# Patient Record
Sex: MALE | Race: WHITE | Employment: OTHER | URBAN - METROPOLITAN AREA
[De-identification: names, ages, dates, MRNs, and addresses within clinical notes are randomized per-mention and may not be internally consistent; named-entity substitution may affect disease eponyms.]

---

## 2019-02-02 ENCOUNTER — HOSPITAL ENCOUNTER (OUTPATIENT)
Age: 59
Discharge: HOME | End: 2019-02-02
Payer: COMMERCIAL

## 2019-02-02 LAB
ALBUMIN SERPL-MCNC: 4.5 G/DL (ref 3.5–5.2)
ALP BLD-CCNC: 81 U/L (ref 40–129)
ALT SERPL-CCNC: 46 U/L (ref 0–40)
ANION GAP SERPL CALCULATED.3IONS-SCNC: 11 MMOL/L (ref 7–16)
AST SERPL-CCNC: 28 U/L (ref 0–39)
BASOPHILS ABSOLUTE: 0.03 E9/L (ref 0–0.2)
BASOPHILS RELATIVE PERCENT: 0.5 % (ref 0–2)
BILIRUB SERPL-MCNC: 0.6 MG/DL (ref 0–1.2)
BUN BLDV-MCNC: 12 MG/DL (ref 6–20)
CALCIUM SERPL-MCNC: 9.4 MG/DL (ref 8.6–10.2)
CHLORIDE BLD-SCNC: 101 MMOL/L (ref 98–107)
CHOLESTEROL, FASTING: 253 MG/DL (ref 0–199)
CO2: 27 MMOL/L (ref 22–29)
CREAT SERPL-MCNC: 0.9 MG/DL (ref 0.7–1.2)
EOSINOPHILS ABSOLUTE: 0.21 E9/L (ref 0.05–0.5)
EOSINOPHILS RELATIVE PERCENT: 3.2 % (ref 0–6)
GFR AFRICAN AMERICAN: >60
GFR NON-AFRICAN AMERICAN: >60 ML/MIN/1.73
GLUCOSE BLD-MCNC: 98 MG/DL (ref 74–99)
HBA1C MFR BLD: 5.4 % (ref 4–5.6)
HCT VFR BLD CALC: 42.4 % (ref 37–54)
HDLC SERPL-MCNC: 43 MG/DL
HEMOGLOBIN: 14.6 G/DL (ref 12.5–16.5)
IMMATURE GRANULOCYTES #: 0.01 E9/L
IMMATURE GRANULOCYTES %: 0.2 % (ref 0–5)
LDL CHOLESTEROL CALCULATED: 170 MG/DL (ref 0–99)
LYMPHOCYTES ABSOLUTE: 1.92 E9/L (ref 1.5–4)
LYMPHOCYTES RELATIVE PERCENT: 28.8 % (ref 20–42)
MCH RBC QN AUTO: 33.6 PG (ref 26–35)
MCHC RBC AUTO-ENTMCNC: 34.4 % (ref 32–34.5)
MCV RBC AUTO: 97.5 FL (ref 80–99.9)
MONOCYTES ABSOLUTE: 0.53 E9/L (ref 0.1–0.95)
MONOCYTES RELATIVE PERCENT: 8 % (ref 2–12)
NEUTROPHILS ABSOLUTE: 3.96 E9/L (ref 1.8–7.3)
NEUTROPHILS RELATIVE PERCENT: 59.3 % (ref 43–80)
PDW BLD-RTO: 12.4 FL (ref 11.5–15)
PLATELET # BLD: 165 E9/L (ref 130–450)
PMV BLD AUTO: 11.8 FL (ref 7–12)
POTASSIUM SERPL-SCNC: 4.5 MMOL/L (ref 3.5–5)
PROSTATE SPECIFIC ANTIGEN: 0.25 NG/ML (ref 0–4)
RBC # BLD: 4.35 E12/L (ref 3.8–5.8)
SODIUM BLD-SCNC: 139 MMOL/L (ref 132–146)
T4 TOTAL: 6 MCG/DL (ref 4.5–11.7)
TOTAL PROTEIN: 6.6 G/DL (ref 6.4–8.3)
TRIGLYCERIDE, FASTING: 199 MG/DL (ref 0–149)
TSH SERPL DL<=0.05 MIU/L-ACNC: 1.49 UIU/ML (ref 0.27–4.2)
VLDLC SERPL CALC-MCNC: 40 MG/DL
WBC # BLD: 6.7 E9/L (ref 4.5–11.5)

## 2019-02-02 PROCEDURE — 80053 COMPREHEN METABOLIC PANEL: CPT

## 2019-02-02 PROCEDURE — 36415 COLL VENOUS BLD VENIPUNCTURE: CPT

## 2019-02-02 PROCEDURE — 83036 HEMOGLOBIN GLYCOSYLATED A1C: CPT

## 2019-02-02 PROCEDURE — 85025 COMPLETE CBC W/AUTO DIFF WBC: CPT

## 2019-02-02 PROCEDURE — 84436 ASSAY OF TOTAL THYROXINE: CPT

## 2019-02-02 PROCEDURE — 84443 ASSAY THYROID STIM HORMONE: CPT

## 2019-02-02 PROCEDURE — 80061 LIPID PANEL: CPT

## 2019-02-02 PROCEDURE — G0103 PSA SCREENING: HCPCS

## 2019-12-23 RX ORDER — PERMETHRIN 50 MG/G
CREAM TOPICAL
COMMUNITY
End: 2020-02-20 | Stop reason: SDUPTHER

## 2020-02-20 ENCOUNTER — OFFICE VISIT (OUTPATIENT)
Dept: PRIMARY CARE CLINIC | Age: 60
End: 2020-02-20
Payer: COMMERCIAL

## 2020-02-20 VITALS
DIASTOLIC BLOOD PRESSURE: 82 MMHG | SYSTOLIC BLOOD PRESSURE: 138 MMHG | HEIGHT: 70 IN | WEIGHT: 269 LBS | TEMPERATURE: 98.3 F | BODY MASS INDEX: 38.51 KG/M2

## 2020-02-20 PROCEDURE — 99396 PREV VISIT EST AGE 40-64: CPT | Performed by: FAMILY MEDICINE

## 2020-02-20 RX ORDER — PERMETHRIN 50 MG/G
CREAM TOPICAL ONCE
Qty: 1 TUBE | Refills: 12 | Status: SHIPPED | OUTPATIENT
Start: 2020-02-20 | End: 2020-02-21 | Stop reason: SDUPTHER

## 2020-02-20 NOTE — PROGRESS NOTES
F42.9 Obsessive-compulsive disorder, unspecified    Care Plan:    Assessment #4: Z68.38 Body mass index (BMI) 38.0-38.9, adult    Care Plan:    Comments : LOW FAT DIET EXER --- FU WITH DERM ARMAILE AND PSYCH------A    GREAT DEAL OF TIME SPENT REVIEWING MEDS, DIET, EXERCISE, SOCIAL    ISSUES. ALSO REVIEWING CHART BEFORE ENTERING ROOM WITH PATIENT    AND FINISHING CHARTING AFTER LEAVING PATIENT'S ROOM-----MORE THAN    HALF OF THE FACE-TO-FACE TIME WITH PATIENT SPENT IN COUNSELING    AND COORDINATING CARE----- ------ WARNED OF RISK OF SIDE EFFECTS OF    MEDS AND POSSIBLE MEDICATION INTERACTIONS ------GREAT DEAL OF    TIME SPENT REVIEWING ALL LABS RESULTS AND COMPARING WITH OLD    NUMBERS AND EXPLAINING THE MEANING OF THE TESTS------ --------GREAT    DEAL OF TIME EXPLAINING DISEASE PROCESS AND RISK ASSOCIATED WITH    SAME, LONG TERM IMPLICATIONS AND POSSIBLE ADVERSE CONDITIONS.    -----TAUGHT SYMPTOMS TO WATCH AND NOTIFY-----    Follow Up : ONE YEAR LAB [address] [facility]       Past Medical History:   Diagnosis Date    Hyperlipidemia     Hypertension     Type 2 diabetes mellitus without complication ([facility] [address]      No family history on file. No past surgical history on file. Vitals:    02/20/20 1243   BP: 138/82   Temp: 98.3 °F (36.8 °C)   Weight: 269 lb (122 kg)   Height: 5' 10\" (1.778 m)       Objective:    Physical Exam  Vitals signs reviewed. Constitutional:       Appearance: He is well-developed. HENT:      Head: Normocephalic. Eyes:      Pupils: Pupils are equal, round, and reactive to light. Neck:      Musculoskeletal: Normal range of motion. Cardiovascular:      Rate and Rhythm: Normal rate and regular rhythm. Pulmonary:      Effort: Pulmonary effort is normal.      Breath sounds: Normal breath sounds. Abdominal:      Palpations: Abdomen is soft. Musculoskeletal: Normal range of motion. Skin:     General: Skin is warm. Comments: No visible rash.    Neurological:      Mental Status: He is alert and oriented to person, place, and time. Psychiatric:         Behavior: Behavior normal.         Isla Buerger was seen today for annual exam.    Diagnoses and all orders for this visit:    Encounter for annual general medical examination with abnormal findings in adult    Anxiety    Other orders  -     permethrin (ELIMITE) 5 % cream; Apply topically once for 1 dose Apply topically as directed  -     neomycin-polymyxin-dexamethasone (MAXITROL) 0.1 % ophthalmic suspension; Place 2 drops into both eyes 4 times daily        Comments: He refuses see any specialist at all. He refuses to consider his delusions of parasite ptosis and anxiety issue. He himself states that he has parasites on his body. I am unable to convince him to do anything about it. He is aware of the risk. He refused laboratory last year repeat laboratory studies in the system advised him do so soon and see me a week later. Maintenance issues. He refuses any colonoscopies or any testing at all. He refuses any immunizations. A great deal of time spent reviewing medications, diet, exercise, social issues. Also reviewing the chart before entering the room with patient and finishing charting after leaving patient's room. More than half of that time was spent face to face with the patient in counseling and coordinating care. Follow Up: Return in about 1 year (around 2/20/2021).      Seen by:  Aleah Palm DO

## 2020-02-21 ENCOUNTER — TELEPHONE (OUTPATIENT)
Dept: PRIMARY CARE CLINIC | Age: 60
End: 2020-02-21

## 2020-02-21 PROBLEM — F41.9 ANXIETY: Chronic | Status: ACTIVE | Noted: 2020-02-21

## 2020-02-21 PROBLEM — F41.9 ANXIETY: Status: ACTIVE | Noted: 2020-02-21

## 2020-02-21 RX ORDER — PERMETHRIN 50 MG/G
CREAM TOPICAL ONCE
Qty: 6 TUBE | Refills: 12 | Status: SHIPPED | OUTPATIENT
Start: 2020-02-21 | End: 2020-02-21

## 2020-02-21 ASSESSMENT — PATIENT HEALTH QUESTIONNAIRE - PHQ9
SUM OF ALL RESPONSES TO PHQ QUESTIONS 1-9: 0
1. LITTLE INTEREST OR PLEASURE IN DOING THINGS: 0
SUM OF ALL RESPONSES TO PHQ9 QUESTIONS 1 & 2: 0
2. FEELING DOWN, DEPRESSED OR HOPELESS: 0
SUM OF ALL RESPONSES TO PHQ QUESTIONS 1-9: 0

## 2020-02-21 ASSESSMENT — ENCOUNTER SYMPTOMS
SHORTNESS OF BREATH: 1
ALLERGIC/IMMUNOLOGIC NEGATIVE: 1
GASTROINTESTINAL NEGATIVE: 1
EYES NEGATIVE: 1

## 2020-02-21 NOTE — TELEPHONE ENCOUNTER
The pt is calling because normally you give him 3 tubes a month of his permethrin 5 % cream but you only gave him one tube and he says that will not be enough

## 2020-03-13 RX ORDER — SULFAMETHOXAZOLE AND TRIMETHOPRIM 800; 160 MG/1; MG/1
1 TABLET ORAL
COMMUNITY
End: 2021-02-26

## 2020-03-13 RX ORDER — PERMETHRIN 50 MG/G
CREAM TOPICAL
COMMUNITY
Start: 2020-02-21 | End: 2020-03-13 | Stop reason: SDUPTHER

## 2020-03-14 RX ORDER — PERMETHRIN 50 MG/G
CREAM TOPICAL ONCE
Qty: 1 TUBE | Refills: 12 | Status: SHIPPED | OUTPATIENT
Start: 2020-03-14 | End: 2020-03-14

## 2020-03-20 RX ORDER — PERMETHRIN 50 MG/G
CREAM TOPICAL
Qty: 1 TUBE | Refills: 12 | Status: SHIPPED
Start: 2020-03-20 | End: 2020-03-24 | Stop reason: SDUPTHER

## 2020-03-20 RX ORDER — PERMETHRIN 50 MG/G
CREAM TOPICAL
COMMUNITY
Start: 2020-03-19 | End: 2020-03-20 | Stop reason: SDUPTHER

## 2020-03-20 NOTE — TELEPHONE ENCOUNTER
Please add proper directions    Name of Medication(s) Requested:  permethrin cream 5%     [facility]    Medication(s) pended? [x] Yes  [] No    Last Appointment:  2/20/2020    Future appts:  Future Appointments   Date Time Provider Jabier Giles   2/26/2021  3:45 PM DO [facility]        Does patient need call back?   [] Yes  [x] No

## 2020-03-24 RX ORDER — PERMETHRIN 50 MG/G
CREAM TOPICAL
Qty: 6 TUBE | Refills: 12 | Status: SHIPPED
Start: 2020-03-24 | End: 2020-04-02 | Stop reason: SDUPTHER

## 2020-03-24 NOTE — TELEPHONE ENCOUNTER
Clarification request from [employer]. Please resend Permethrin cream 5% 60 grams with the proper directions.

## 2020-04-02 RX ORDER — PERMETHRIN 50 MG/G
CREAM TOPICAL
Qty: 6 TUBE | Refills: 12 | Status: SHIPPED
Start: 2020-04-02 | End: 2020-11-19 | Stop reason: SDUPTHER

## 2020-11-19 RX ORDER — PERMETHRIN 50 MG/G
CREAM TOPICAL
Qty: 9 TUBE | Refills: 12 | Status: SHIPPED
Start: 2020-11-19 | End: 2021-02-26 | Stop reason: SDUPTHER

## 2021-02-26 ENCOUNTER — OFFICE VISIT (OUTPATIENT)
Dept: PRIMARY CARE CLINIC | Age: 61
End: 2021-02-26
Payer: COMMERCIAL

## 2021-02-26 DIAGNOSIS — L30.9 DERMATITIS: ICD-10-CM

## 2021-02-26 DIAGNOSIS — Z00.01 ENCOUNTER FOR ANNUAL GENERAL MEDICAL EXAMINATION WITH ABNORMAL FINDINGS IN ADULT: Primary | ICD-10-CM

## 2021-02-26 PROCEDURE — G8484 FLU IMMUNIZE NO ADMIN: HCPCS | Performed by: FAMILY MEDICINE

## 2021-02-26 PROCEDURE — 99396 PREV VISIT EST AGE 40-64: CPT | Performed by: FAMILY MEDICINE

## 2021-02-26 RX ORDER — PERMETHRIN 50 MG/G
CREAM TOPICAL
Qty: 9 TUBE | Refills: 12 | Status: SHIPPED
Start: 2021-02-26 | End: 2022-02-14 | Stop reason: SDUPTHER

## 2021-02-26 NOTE — PROGRESS NOTES
21  Name: Sam Painter    : 1960    Sex: male    Age: 62 y.o. Subjective:  Chief Complaint: Patient is here for one year ck     Failed lab   Down two  Lbs   obessed with his thought that he has bugs crawling all over his body. It upsets him constantly. I have previously talked to the wife and she is very frustrated with him. His entire day is spent him trying to put things on his skin. He uses all kind of medications including bleach. .  I warned him that he is really damaging his body but he processed with his delusions of  parasitosis. he refuses to see dermatology pulmonary cardiology. [facility] and [facility]  refuses. He refused to use an inhaler. He only wants a refill on his permethrin cream      Review of Systems   Constitutional: Negative. HENT: Negative. Eyes: Negative. Respiratory: Negative. Cardiovascular: Negative. Gastrointestinal: Negative. Endocrine: Negative. Genitourinary: Negative. Musculoskeletal: Negative. Skin: Positive for rash (see  hpi). Allergic/Immunologic: Negative. Neurological: Negative. Hematological: Negative. Psychiatric/Behavioral: Negative.           Current Outpatient Medications:     neomycin-polymyxin-dexamethasone (MAXITROL) 0.1 % ophthalmic suspension, Place 2 drops into both eyes 4 times daily, Disp: 1 Bottle, Rfl: 12    permethrin (ELIMITE) 5 % cream, Apply topically daily, Disp: 9 Tube, Rfl: 12  No Known Allergies  Social History     Socioeconomic History    Marital status:      Spouse name: Not on file    Number of children: Not on file    Years of education: Not on file    Highest education level: Not on file   Occupational History    Not on file   Social Needs    Financial resource strain: Not on file    Food insecurity     Worry: Not on file     Inability: Not on file    Transportation needs     Medical: Not on file     Non-medical: Not on file   Tobacco Use    Smoking status: Former Smoker     Types: Cigarettes    Smokeless tobacco: Never Used   Substance and Sexual Activity    Alcohol use:  Yes    Drug use: Not on file    Sexual activity: Not on file   Lifestyle    Physical activity     Days per week: Not on file     Minutes per session: Not on file    Stress: Not on file   Relationships    Social connections     Talks on phone: Not on file     Gets together: Not on file     Attends Mu-ism service: Not on file     Active member of club or organization: Not on file     Attends meetings of clubs or organizations: Not on file     Relationship status: Not on file    Intimate partner violence     Fear of current or ex partner: Not on file     Emotionally abused: Not on file     Physically abused: Not on file     Forced sexual activity: Not on file   Other Topics Concern    Not on file   Social History Narrative    [address]#: Eugene Fanvibe : 1960 Sex: M Age: 63 years    Subjective    CC: HERE FOR CK RE LAB AND ANX    HPI: FEEL OK NO CHG SAME C/O OF PARASITES AND HE FEEL SOVER WHOLE BODY    AND USES BLEACH LAB NOTED HERE ALONE PT [facility] CK HE SEESING PSYCH HE SPENDS 6 HRS A DAY [address]:    Const: DENIES SYMPTOMS OTHER THAN STATED ABOVE    Eyes: DENIES SUDDEN CHANGE OF VISION    ENMT: DENIES PURULENT DISCHARGE AND PAINFUL LESIONS    CV: DENIES SQUEEZING CHEST PAIN, MASSIVE EDEMA OR CONSTANT PALPITAIONS    Resp: DENIES HEMOPTYSIS    GI: DENIES CONSTANT ABDOMINAL PAIN OR GROSSLY BLOODY DISCHARGBE    : DENIES DYSURIA OR HEMATURIA    Musculo: DENIES JOINT PAIN AND WEAKNESS    Skin: HPI    Neuro: DENIES INTRACTABLE HEADACHE, SEIZURE AND SYNCOPE    Psych: DENIES CONSTANT ANXIETY, SEVERE DEPRESSION AND RECURRENT PANIC    Endocrine: DENIES EXTREME SHIFTS IN GLUCOSE AND SEVERE THYROID SYMPTOMS    Hema/Lymph: DENIES GROSS BLEEDING OR CLOTTING SYMPTOMS    Current Meds:Maxitrol 3.5-87820-9. 1 two drops both eyes four times a day    Permethrin 5 % ad- to whole body-----every 5 days x 6 treatments    Tobrex 0.3 % 2 drops both eyes four times a day x 5 days    Allergies: NKDA    PMH:    Problem List: Essential hypertension, Benign localized hyperplasia of prostate, Hyperlipidemia, Type 2    diabetes mellitus, Benign essential hypertension    Health Maintenance:    Colonoscopy - (3/21/2011)    Colonoscopy Screening - (3/21/2011)    SMOKER--QUIT    ETOH    --LONG DISTANCE    HIGH CHOL--REFUSES TREATMENT---    ONE KID WITH FIRST WIFE---    RASH 4------OCD ABOUT RASH ARAMILE AND [facility]-- DELUSIONS OF PARASITOSIS    PT REFUSES COLONOSCOPY -    DISABILITY FOR PSYCH 2018    HYPERLIPIDEMIA 2-19 REFUSES STATIN OR ANY MED    Reviewed and updated.     Johann Oakes Skipper  1960 Page #2    Objective    BP: 134/82 T: 98.7 Ht: 70\" 5'10\" Ht cm: 177.8 Wt: 271lb Wt Prior: 274lb as of 19 Wt Dif: -3lb Wt    k.926 Wt kg Prior: 124.286 as of 19 Wt kg Dif: -1.360 BMI: 38.9 BSA: 2.38    Exam:    Const: ALERT AND ORIENTED X 3    Eyes: EOMI, ALANA    ENMT: CLEAR WITH NO ERYTHEMA    Neck: SUPPLE AND SYMMETRIC WITH NO MASSES OR JVD    Resp: NO RALES, RONCHI OR WHEEZING    CV:R R R WITH NO MURMURS, CAROTIDS, ABDOMINAL AORTA AND PERIPHERAL PULSES    GOOD    Abdomen: NO MASSES, TENDERNESS, HERNIAS, OR ORGANOMEGALY WITH GOOD BOWEL    SOUNDS    Musculo: NORMAL GAIT    Skin: DRY SKIN AND SCABAS FEW BOTH ARMS AND LEGS    Neuro: ALERT AND ORIENTED X 3 WITH CN AND DTR'S INTACT    Psych: COOPERATIVE, APPROPRIATE AND REALISTIC    Assessment #1: Z00.01 Encounter for general adult medical examination with abnorma    Care Plan:    Assessment #2: I10 Essential (primary) hypertension    Care Plan:    Assessment #3: F42.9 Obsessive-compulsive disorder, unspecified    Care Plan:    Assessment #4: Z68.38 Body mass index (BMI) 38.0-38.9, adult    Care Plan:    Comments : LOW FAT DIET EXER --- visit:    Encounter for annual general medical examination with abnormal findings in adult    Dermatitis  -     neomycin-polymyxin-dexamethasone (MAXITROL) 0.1 % ophthalmic suspension; Place 2 drops into both eyes 4 times daily  -     permethrin (ELIMITE) 5 % cream; Apply topically daily        Comments: refill meds    advised laboratory studies and complete physical and he refuses. I advised evaluation of cardiology and. He refuses. He refuses colonoscopy. Refuses to do any test.  He hardly ever leaves the house any stays in the basement. He has no relation with his son because his son does not want to deal with him. His son lives at home. The wife is frustrated. Offered psychiatry again and he refuses to consider that this is a psychiatric issue. A great deal of time spent reviewing medications, diet, exercise, social issues. Also reviewing the chart before entering the room with patient and finishing charting after leaving patient's room. More than half of that time was spent face to face with the patient in counseling and coordinating care. Follow Up: Return in about 1 year (around 2/26/2022).      Seen by:  Lawrence Harper, DO

## 2021-02-27 VITALS
WEIGHT: 264 LBS | HEART RATE: 105 BPM | TEMPERATURE: 97.5 F | DIASTOLIC BLOOD PRESSURE: 82 MMHG | OXYGEN SATURATION: 99 % | BODY MASS INDEX: 37.88 KG/M2 | SYSTOLIC BLOOD PRESSURE: 135 MMHG

## 2021-02-27 ASSESSMENT — PATIENT HEALTH QUESTIONNAIRE - PHQ9
SUM OF ALL RESPONSES TO PHQ QUESTIONS 1-9: 0
SUM OF ALL RESPONSES TO PHQ QUESTIONS 1-9: 0
1. LITTLE INTEREST OR PLEASURE IN DOING THINGS: 0
2. FEELING DOWN, DEPRESSED OR HOPELESS: 0
SUM OF ALL RESPONSES TO PHQ9 QUESTIONS 1 & 2: 0

## 2021-02-27 ASSESSMENT — ENCOUNTER SYMPTOMS
ALLERGIC/IMMUNOLOGIC NEGATIVE: 1
RESPIRATORY NEGATIVE: 1
GASTROINTESTINAL NEGATIVE: 1
EYES NEGATIVE: 1

## 2021-03-16 ENCOUNTER — TELEPHONE (OUTPATIENT)
Dept: PRIMARY CARE CLINIC | Age: 61
End: 2021-03-16

## 2021-03-16 DIAGNOSIS — Z00.01 ENCOUNTER FOR ANNUAL GENERAL MEDICAL EXAMINATION WITH ABNORMAL FINDINGS IN ADULT: Primary | ICD-10-CM

## 2021-03-17 ENCOUNTER — TELEPHONE (OUTPATIENT)
Dept: PRIMARY CARE CLINIC | Age: 61
End: 2021-03-17

## 2021-03-17 ENCOUNTER — HOSPITAL ENCOUNTER (OUTPATIENT)
Age: 61
Discharge: HOME | End: 2021-03-17
Payer: COMMERCIAL

## 2021-03-17 DIAGNOSIS — R73.03 PRE-DIABETES: ICD-10-CM

## 2021-03-17 DIAGNOSIS — Z00.01 ENCOUNTER FOR ANNUAL GENERAL MEDICAL EXAMINATION WITH ABNORMAL FINDINGS IN ADULT: ICD-10-CM

## 2021-03-17 DIAGNOSIS — R79.89 ELEVATED LIVER FUNCTION TESTS: Primary | ICD-10-CM

## 2021-03-17 DIAGNOSIS — E78.2 MIXED HYPERLIPIDEMIA: ICD-10-CM

## 2021-03-17 LAB
ALBUMIN SERPL-MCNC: 4.5 G/DL (ref 3.5–5.2)
ALP BLD-CCNC: 141 U/L (ref 40–129)
ALT SERPL-CCNC: 57 U/L (ref 0–40)
ANION GAP SERPL CALCULATED.3IONS-SCNC: 10 MMOL/L (ref 7–16)
AST SERPL-CCNC: 42 U/L (ref 0–39)
BASOPHILS ABSOLUTE: 0.04 E9/L (ref 0–0.2)
BASOPHILS RELATIVE PERCENT: 0.6 % (ref 0–2)
BILIRUB SERPL-MCNC: 0.6 MG/DL (ref 0–1.2)
BILIRUBIN URINE: NEGATIVE
BLOOD, URINE: NEGATIVE
BUN BLDV-MCNC: 8 MG/DL (ref 8–23)
CALCIUM SERPL-MCNC: 9.6 MG/DL (ref 8.6–10.2)
CHLORIDE BLD-SCNC: 105 MMOL/L (ref 98–107)
CHOLESTEROL, TOTAL: 221 MG/DL (ref 0–199)
CLARITY: CLEAR
CO2: 27 MMOL/L (ref 22–29)
COLOR: YELLOW
CREAT SERPL-MCNC: 0.8 MG/DL (ref 0.7–1.2)
EOSINOPHILS ABSOLUTE: 0.23 E9/L (ref 0.05–0.5)
EOSINOPHILS RELATIVE PERCENT: 3.3 % (ref 0–6)
GFR AFRICAN AMERICAN: >60
GFR NON-AFRICAN AMERICAN: >60 ML/MIN/1.73
GLUCOSE BLD-MCNC: 110 MG/DL (ref 74–99)
GLUCOSE URINE: NEGATIVE MG/DL
HCT VFR BLD CALC: 39.5 % (ref 37–54)
HDLC SERPL-MCNC: 43 MG/DL
HEMOGLOBIN: 13.6 G/DL (ref 12.5–16.5)
IMMATURE GRANULOCYTES #: 0.01 E9/L
IMMATURE GRANULOCYTES %: 0.1 % (ref 0–5)
KETONES, URINE: NEGATIVE MG/DL
LDL CHOLESTEROL CALCULATED: 164 MG/DL (ref 0–99)
LEUKOCYTE ESTERASE, URINE: NEGATIVE
LYMPHOCYTES ABSOLUTE: 1.61 E9/L (ref 1.5–4)
LYMPHOCYTES RELATIVE PERCENT: 23.4 % (ref 20–42)
MCH RBC QN AUTO: 37.3 PG (ref 26–35)
MCHC RBC AUTO-ENTMCNC: 34.4 % (ref 32–34.5)
MCV RBC AUTO: 108.2 FL (ref 80–99.9)
MONOCYTES ABSOLUTE: 0.59 E9/L (ref 0.1–0.95)
MONOCYTES RELATIVE PERCENT: 8.6 % (ref 2–12)
NEUTROPHILS ABSOLUTE: 4.4 E9/L (ref 1.8–7.3)
NEUTROPHILS RELATIVE PERCENT: 64 % (ref 43–80)
NITRITE, URINE: NEGATIVE
PDW BLD-RTO: 11.6 FL (ref 11.5–15)
PH UA: 5.5 (ref 5–9)
PLATELET # BLD: 166 E9/L (ref 130–450)
PMV BLD AUTO: 11.8 FL (ref 7–12)
POTASSIUM SERPL-SCNC: 4.1 MMOL/L (ref 3.5–5)
PROSTATE SPECIFIC ANTIGEN: 0.2 NG/ML (ref 0–4)
PROTEIN UA: NEGATIVE MG/DL
RBC # BLD: 3.65 E12/L (ref 3.8–5.8)
SODIUM BLD-SCNC: 142 MMOL/L (ref 132–146)
SPECIFIC GRAVITY UA: 1.02 (ref 1–1.03)
TOTAL PROTEIN: 6.8 G/DL (ref 6.4–8.3)
TRIGL SERPL-MCNC: 72 MG/DL (ref 0–149)
UROBILINOGEN, URINE: 0.2 E.U./DL
VLDLC SERPL CALC-MCNC: 14 MG/DL
WBC # BLD: 6.9 E9/L (ref 4.5–11.5)

## 2021-03-17 PROCEDURE — 81003 URINALYSIS AUTO W/O SCOPE: CPT

## 2021-03-17 PROCEDURE — 80061 LIPID PANEL: CPT

## 2021-03-17 PROCEDURE — 80053 COMPREHEN METABOLIC PANEL: CPT

## 2021-03-17 PROCEDURE — G0103 PSA SCREENING: HCPCS

## 2021-03-17 PROCEDURE — 85025 COMPLETE CBC W/AUTO DIFF WBC: CPT

## 2021-03-17 PROCEDURE — 36415 COLL VENOUS BLD VENIPUNCTURE: CPT

## 2021-03-17 NOTE — TELEPHONE ENCOUNTER
Let patient know the cholesterol is better from 253 toward 2 5021. His fasting sugar is 110. Which is elevated and his liver functions are elevated. Have him discontinue all alcohol if he is drinking any. Get blood work done in 6 months and see me a week later. Low-fat low sugar diet.

## 2021-03-18 NOTE — TELEPHONE ENCOUNTER
Pt notified and voiced understanding.   Will have his wife schedule his appt when she comes in to see Dr. Rudy Cain

## 2021-06-25 ENCOUNTER — OFFICE VISIT (OUTPATIENT)
Dept: PRIMARY CARE CLINIC | Age: 61
End: 2021-06-25
Payer: COMMERCIAL

## 2021-06-25 VITALS
HEIGHT: 70 IN | TEMPERATURE: 99 F | BODY MASS INDEX: 34.93 KG/M2 | SYSTOLIC BLOOD PRESSURE: 138 MMHG | WEIGHT: 244 LBS | DIASTOLIC BLOOD PRESSURE: 78 MMHG

## 2021-06-25 DIAGNOSIS — L30.9 DERMATITIS: Primary | ICD-10-CM

## 2021-06-25 DIAGNOSIS — R73.03 PRE-DIABETES: ICD-10-CM

## 2021-06-25 DIAGNOSIS — F41.9 ANXIETY: Chronic | ICD-10-CM

## 2021-06-25 DIAGNOSIS — R60.0 EDEMA OF BOTH LOWER LEGS: ICD-10-CM

## 2021-06-25 PROCEDURE — 1036F TOBACCO NON-USER: CPT | Performed by: FAMILY MEDICINE

## 2021-06-25 PROCEDURE — G8417 CALC BMI ABV UP PARAM F/U: HCPCS | Performed by: FAMILY MEDICINE

## 2021-06-25 PROCEDURE — 3017F COLORECTAL CA SCREEN DOC REV: CPT | Performed by: FAMILY MEDICINE

## 2021-06-25 PROCEDURE — G8428 CUR MEDS NOT DOCUMENT: HCPCS | Performed by: FAMILY MEDICINE

## 2021-06-25 PROCEDURE — 99214 OFFICE O/P EST MOD 30 MIN: CPT | Performed by: FAMILY MEDICINE

## 2021-06-25 ASSESSMENT — ENCOUNTER SYMPTOMS
EYES NEGATIVE: 1
RESPIRATORY NEGATIVE: 1
ALLERGIC/IMMUNOLOGIC NEGATIVE: 1
GASTROINTESTINAL NEGATIVE: 1

## 2021-06-25 ASSESSMENT — PATIENT HEALTH QUESTIONNAIRE - PHQ9
SUM OF ALL RESPONSES TO PHQ QUESTIONS 1-9: 0
SUM OF ALL RESPONSES TO PHQ QUESTIONS 1-9: 0
1. LITTLE INTEREST OR PLEASURE IN DOING THINGS: 0
SUM OF ALL RESPONSES TO PHQ9 QUESTIONS 1 & 2: 0
SUM OF ALL RESPONSES TO PHQ QUESTIONS 1-9: 0
2. FEELING DOWN, DEPRESSED OR HOPELESS: 0

## 2021-06-25 NOTE — PROGRESS NOTES
21  Name: Albin Figueroa    : 1960    Sex: male    Age: 62 y.o. Subjective:  Chief Complaint: Patient is here for edema both legs      Swelling  Both legs for a year    He  Feels the   Parasite in his body I s  cutting off his blood supply to his legs  Pt asking to go to [facility]   Dermatology dept   no  Cp or sob with exertion  He showers   Qid and  Wash  Face  Every  hour      Review of Systems   Constitutional: Negative. HENT: Negative. Eyes: Negative. Respiratory: Negative. Cardiovascular: Positive for leg swelling. Negative for chest pain and palpitations. Gastrointestinal: Negative. Endocrine: Negative. Genitourinary: Negative. Musculoskeletal: Negative. Skin: Positive for rash. Allergic/Immunologic: Negative. Neurological: Negative. Hematological: Negative. Psychiatric/Behavioral: Negative. Current Outpatient Medications:     neomycin-polymyxin-dexamethasone (MAXITROL) 0.1 % ophthalmic suspension, Place 2 drops into both eyes 4 times daily, Disp: 1 Bottle, Rfl: 12    permethrin (ELIMITE) 5 % cream, Apply topically daily, Disp: 9 Tube, Rfl: 12  No Known Allergies  Social History     Socioeconomic History    Marital status:      Spouse name: Not on file    Number of children: Not on file    Years of education: Not on file    Highest education level: Not on file   Occupational History    Not on file   Tobacco Use    Smoking status: Former Smoker     Types: Cigarettes    Smokeless tobacco: Never Used   Substance and Sexual Activity    Alcohol use:  Yes    Drug use: Not on file    Sexual activity: Not on file   Other Topics Concern    Not on file   Social History Narrative            Colonoscopy - (3/21/2011)    Colonoscopy Screening - (3/21/2011)    SMOKER--QUIT    ETOH    --LONG DISTANCE    HIGH CHOL--REFUSES TREATMENT---    ONE KID WITH FIRST WIFE---    RASH -----OCD ABOUT RASH FAIZA AND [facility]-- DELUSIONS OF PARASITOSIS    PT REFUSES COLONOSCOPY 2-19    DISABILITY FOR PSYCH 2018    HYPERLIPIDEMIA 2-19 REFUSES STATIN OR ANY MED    Elev  LFT   3-21 and wife told me after drinking bottle of etoh every three days--         Social Determinants of Health     Financial Resource Strain:     Difficulty of Paying Living Expenses:    Food Insecurity:     Worried About Running Out of Food in the Last Year:     [address] in the Last Year:    Transportation Needs:     Lack of Transportation (Medical):  Lack of Transportation (Non-Medical):    Physical Activity:     Days of Exercise per Week:     Minutes of Exercise per Session:    Stress:     Feeling of Stress :    Social Connections:     Frequency of Communication with Friends and Family:     Frequency of Social Gatherings with Friends and Family:     Attends Christianity Services:     Active Member of Clubs or Organizations:     Attends Club or Organization Meetings:     Marital Status:    Intimate Partner Violence:     Fear of Current or Ex-Partner:     Emotionally Abused:     Physically Abused:     Sexually Abused:       Past Medical History:   Diagnosis Date    Hyperlipidemia     Hypertension     Type 2 diabetes mellitus without complication ([facility] [address]      No family history on file. No past surgical history on file. Vitals:    06/25/21 1500   BP: 138/78   Temp: 99 °F (37.2 °C)   TempSrc: Oral   Weight: 244 lb (110.7 kg)   Height: 5' 10\" (1.778 m)       Objective:    Physical Exam  Vitals reviewed. Constitutional:       Appearance: He is well-developed. HENT:      Head: Normocephalic. Eyes:      Pupils: Pupils are equal, round, and reactive to light. Cardiovascular:      Rate and Rhythm: Normal rate and regular rhythm. Pulmonary:      Effort: Pulmonary effort is normal.      Breath sounds: Normal breath sounds. Abdominal:      Palpations: Abdomen is soft. Musculoskeletal:         General: Normal range of motion.       Cervical back: Normal range of motion. Skin:     Comments: Zan  lisa to skin both legs   Neurological:      Mental Status: He is alert and oriented to person, place, and time. Psychiatric:         Behavior: Behavior normal.         Rabia Holloway was seen today for leg swelling. Diagnoses and all orders for this visit:    Dermatitis  -     External Referral To Dermatology    Edema of both lower legs  -     Zoie Trejo MD, Vascular Surgery, [place]    Anxiety    Pre-diabetes        Comments: appt  Derm  [facility]  appt    Vas       If  Cp or sob     Call or   Er    A great deal of time spent reviewing medications, diet, exercise, social issues. Also reviewing the chart before entering the room with patient and finishing charting after leaving patient's room.  More than half of that time was spent face to face with the patient in counseling and coordinating care          Follow Up: Return for Reg Appt, See   two  Referral.     Seen by:  Rocio Clemens DO

## 2021-07-30 ENCOUNTER — TELEPHONE (OUTPATIENT)
Dept: VASCULAR SURGERY | Age: 61
End: 2021-07-30

## 2021-08-02 ENCOUNTER — OFFICE VISIT (OUTPATIENT)
Dept: VASCULAR SURGERY | Age: 61
End: 2021-08-02
Payer: COMMERCIAL

## 2021-08-02 VITALS — WEIGHT: 260 LBS | HEIGHT: 70 IN | BODY MASS INDEX: 37.22 KG/M2

## 2021-08-02 DIAGNOSIS — I87.2 VENOUS INSUFFICIENCY OF BOTH LOWER EXTREMITIES: Primary | ICD-10-CM

## 2021-08-02 PROCEDURE — 3017F COLORECTAL CA SCREEN DOC REV: CPT | Performed by: SURGERY

## 2021-08-02 PROCEDURE — G8427 DOCREV CUR MEDS BY ELIG CLIN: HCPCS | Performed by: SURGERY

## 2021-08-02 PROCEDURE — 1036F TOBACCO NON-USER: CPT | Performed by: SURGERY

## 2021-08-02 PROCEDURE — 99204 OFFICE O/P NEW MOD 45 MIN: CPT | Performed by: SURGERY

## 2021-08-02 PROCEDURE — G8417 CALC BMI ABV UP PARAM F/U: HCPCS | Performed by: SURGERY

## 2021-08-02 RX ORDER — TRIAMCINOLONE ACETONIDE 1 MG/G
CREAM TOPICAL 2 TIMES DAILY
COMMUNITY

## 2021-08-02 NOTE — PATIENT INSTRUCTIONS
Patient Education        Varicose Veins: Care Instructions  Your Care Instructions  Varicose veins are twisted, enlarged veins near the surface of the skin. They develop most often in the legs and ankles. Some people may be more likely than others to get varicose veins because of aging or hormone changes or because a parent has them. Being overweight or pregnant can make varicose veins worse. Jobs that require standing for long periods of time also can make them worse. Follow-up care is a key part of your treatment and safety. Be sure to make and go to all appointments, and call your doctor if you are having problems. It's also a good idea to know your test results and keep a list of the medicines you take. How can you care for yourself at home? · Wear compression stockings during the day to help relieve symptoms. They improve blood flow and are the main treatment for varicose veins. Talk to your doctor about which ones to get and where to get them. · Prop up your legs at or above the level of your heart when possible. This helps keep the blood from pooling in your lower legs and improves blood flow to the rest of your body. · Avoid sitting and standing for long periods. This puts added stress on your veins. · Get regular exercise, and control your weight. Walk, bicycle, or swim to improve blood flow in your legs. · If you bump your leg so hard that you know it is likely to bruise, prop up your leg and put ice or a cold pack on the area for 10 to 20 minutes at a time. Try to do this every 1 to 2 hours for the next 3 days (when you are awake) or until the swelling goes down. Put a thin cloth between the ice and your skin. · If you cut or scratch the skin over a vein, it may bleed a lot. Prop up your leg and apply firm pressure with a clean bandage over the site of the bleeding. Continue to apply pressure for a full 15 minutes. Do not check sooner to see if the bleeding has stopped.  If the bleeding has not license by [facility]. If you have questions about a medical condition or this instruction, always ask your healthcare professional. [place] [address] any warranty or liability for your use of this information.

## 2021-08-02 NOTE — PROGRESS NOTES
Vascular Surgery Outpatient Consultation    Reason for Consult:  Bilateral LE edema    PCP : Avis Mills DO  Podiatrist : none    HISTORY OF PRESENT ILLNESS:    The patient is a 62 y.o. male who presents in regards to bilateral LE edema. The symptoms are right greater than left. Symptoms include pain, aching, fatigue, burning and edema typically worse as on feet more, at end of the day. Pain at its worst is a 5/10. He does not take anything for the pain. He has been having problems with swelling since 8/2019. The patient has used over the counter compression stockings in the past and they didn't help. They do not have a hx of DVT in the past.  He denies hx of dvt or varicose veins in his family. He has had issues with parastitic disease and problems associated with what he believes was from ticks and scabies in 2010. He was per his report dx with issues by [facility]. He has not been recently seen by them but plans to reestablish in the future. He feels that these issues are underlying cause of his swelling, skin discoloration. He sprays ammonia and bleach on his feet. He takes a bath in bleach, dish soap and baking soda once a night. He is constantly showering or washing.       ROS : All others Negative if blank [], Positive if [x]  General Urinary   [] Fevers [] Hematuria   [] Chills [] Dysuria   [] Weight Loss Vascular   Skin [] Claudication   [x] Tissue Loss  Intermittently has open wounds on face and head [] Rest Pain   Eyes Neurologic   [] Wears Glasses/Contacts [] Stroke/TIA   [] Vision Changes [] Focal weakness   Respiratory [] Slurred Speech    [] Shortness of breath ENT   Cardiovascular [] Difficulty swallowing   [] Chest Pain Endocrine    [] Shortness of breath with exertion [x] Increased Thirst   Gastrointestinal    [] Abdominal Pain    [] Melena   [] Hematochezia         Past Medical History:        Diagnosis Date    Hyperlipidemia     Hypertension     Leg swelling     Rash  Type 2 diabetes mellitus without complication (HCC)      Past Surgical History:        Procedure Laterality Date    TONSILLECTOMY       Current Medications:   Current Outpatient Medications   Medication Sig Dispense Refill    triamcinolone (KENALOG) 0.1 % cream Apply topically 2 times daily Apply topically 2 times daily.  neomycin-polymyxin-dexamethasone (MAXITROL) 0.1 % ophthalmic suspension Place 2 drops into both eyes 4 times daily 1 Bottle 12    permethrin (ELIMITE) 5 % cream Apply topically daily 9 Tube 12     No current facility-administered medications for this visit. Allergies:  Patient has no known allergies. Social History     Socioeconomic History    Marital status:      Spouse name: Not on file    Number of children: Not on file    Years of education: Not on file    Highest education level: Not on file   Occupational History    Not on file   Tobacco Use    Smoking status: Former Smoker     Types: Cigarettes    Smokeless tobacco: Never Used   Substance and Sexual Activity    Alcohol use: Yes    Drug use: Never    Sexual activity: Not on file   Other Topics Concern    Not on file   Social History Narrative            Colonoscopy - (3/21/2011)    Colonoscopy Screening - (3/21/2011)    SMOKER--QUIT    ETOH    --LONG DISTANCE    HIGH CHOL--REFUSES TREATMENT---    ONE KID WITH FIRST WIFE---    RASH 4-13-----OCD ABOUT RASH ARAMILE AND [facility]-- DELUSIONS OF PARASITOSIS    PT REFUSES COLONOSCOPY 2-19    DISABILITY FOR PSYCH 2018    HYPERLIPIDEMIA 2-19 REFUSES STATIN OR ANY MED    Elev  LFT   3-21 and wife told me after drinking bottle of etoh every three days--         Social Determinants of Health     Financial Resource Strain:     Difficulty of Paying Living Expenses:    Food Insecurity:     Worried About Running Out of Food in the Last Year:     [address] in the Last Year:    Transportation Needs:     Lack of Transportation (Medical):      Lack of dorsalis pedis 2+   R posterior tibial 2+ L posterior tibial 2+     RADIOLOGY:    A/PSymptomatic Varicose Veins of Bilateral LE   Venous insufficiency  · Etiology is likely associated with venous reflux  · I explained to them the pathophysiology of venous reflux and the symptoms associated with it including swelling, pain, edema, heaviness, and even ulceration  · Elevate Bilateral LE while in bed or sitting  · Compression stockings knee high 8-15 mm hg wear daily - copper fit  · Discussed how this is the first line of therapy  · They understand even if surgical intervention was felt to be appropriate in the future they would need to wear compression stockings lifetime  · F/U as outpatient in 2 months  · Call if patient develops worsening of swelling or wounds  · All ?s answered     Parasitic issues, Rash  · He has fu planned with [facility]  · His tx with bleach and constant washing is likely causing some of his skin issues  · I did discuss with patient this  · Significant portion of sxs are likely related to skin changes    Erasmo Hernández MD

## 2021-08-09 ENCOUNTER — OFFICE VISIT (OUTPATIENT)
Dept: PRIMARY CARE CLINIC | Age: 61
End: 2021-08-09
Payer: COMMERCIAL

## 2021-08-09 ENCOUNTER — HOSPITAL ENCOUNTER (OUTPATIENT)
Age: 61
Discharge: HOME | End: 2021-08-09
Payer: COMMERCIAL

## 2021-08-09 VITALS
HEART RATE: 108 BPM | RESPIRATION RATE: 16 BRPM | OXYGEN SATURATION: 97 % | DIASTOLIC BLOOD PRESSURE: 82 MMHG | TEMPERATURE: 98.2 F | WEIGHT: 248 LBS | SYSTOLIC BLOOD PRESSURE: 134 MMHG | BODY MASS INDEX: 35.58 KG/M2

## 2021-08-09 DIAGNOSIS — M10.471 OTHER SECONDARY ACUTE GOUT OF RIGHT FOOT: Primary | ICD-10-CM

## 2021-08-09 DIAGNOSIS — S90.31XA CONTUSION OF RIGHT FOOT, INITIAL ENCOUNTER: ICD-10-CM

## 2021-08-09 DIAGNOSIS — M10.471 OTHER SECONDARY ACUTE GOUT OF RIGHT FOOT: ICD-10-CM

## 2021-08-09 PROBLEM — S90.122A CONTUSION OF FIFTH TOE OF LEFT FOOT: Status: ACTIVE | Noted: 2021-08-09

## 2021-08-09 PROBLEM — M79.672 LEFT FOOT PAIN: Status: ACTIVE | Noted: 2021-08-09

## 2021-08-09 PROBLEM — M10.9 GOUT: Status: ACTIVE | Noted: 2021-08-09

## 2021-08-09 LAB
SEDIMENTATION RATE, ERYTHROCYTE: 15 MM/HR (ref 0–15)
URIC ACID, SERUM: 9.8 MG/DL (ref 3.4–7)

## 2021-08-09 PROCEDURE — 99213 OFFICE O/P EST LOW 20 MIN: CPT | Performed by: FAMILY MEDICINE

## 2021-08-09 PROCEDURE — 3017F COLORECTAL CA SCREEN DOC REV: CPT | Performed by: FAMILY MEDICINE

## 2021-08-09 PROCEDURE — 1036F TOBACCO NON-USER: CPT | Performed by: FAMILY MEDICINE

## 2021-08-09 PROCEDURE — 85651 RBC SED RATE NONAUTOMATED: CPT

## 2021-08-09 PROCEDURE — G8417 CALC BMI ABV UP PARAM F/U: HCPCS | Performed by: FAMILY MEDICINE

## 2021-08-09 PROCEDURE — 36415 COLL VENOUS BLD VENIPUNCTURE: CPT

## 2021-08-09 PROCEDURE — 84550 ASSAY OF BLOOD/URIC ACID: CPT

## 2021-08-09 PROCEDURE — G8427 DOCREV CUR MEDS BY ELIG CLIN: HCPCS | Performed by: FAMILY MEDICINE

## 2021-08-09 RX ORDER — METHYLPREDNISOLONE 4 MG/1
TABLET ORAL
Qty: 1 KIT | Refills: 1 | Status: SHIPPED
Start: 2021-08-09 | End: 2022-02-14

## 2021-08-09 ASSESSMENT — ENCOUNTER SYMPTOMS
EYES NEGATIVE: 1
RESPIRATORY NEGATIVE: 1
GASTROINTESTINAL NEGATIVE: 1
ALLERGIC/IMMUNOLOGIC NEGATIVE: 1

## 2021-08-09 ASSESSMENT — PATIENT HEALTH QUESTIONNAIRE - PHQ9
SUM OF ALL RESPONSES TO PHQ QUESTIONS 1-9: 0
SUM OF ALL RESPONSES TO PHQ9 QUESTIONS 1 & 2: 0
SUM OF ALL RESPONSES TO PHQ QUESTIONS 1-9: 0
2. FEELING DOWN, DEPRESSED OR HOPELESS: 0
1. LITTLE INTEREST OR PLEASURE IN DOING THINGS: 0
SUM OF ALL RESPONSES TO PHQ QUESTIONS 1-9: 0

## 2021-08-09 NOTE — PROGRESS NOTES
21  Name: Bandar Brantley    : 1960    Sex: male    Age: 62 y.o. Subjective:  Chief Complaint: Patient is here for right first toe mtp joint     Bumped it 8 moago and sore off and on since----- worse the last week     Sore yest and hard to put weigh ton it and needed a cane   But tody is better  He did nto take anyhting for it      Review of Systems   Constitutional: Negative. HENT: Negative. Eyes: Negative. Respiratory: Negative. Cardiovascular: Negative. Gastrointestinal: Negative. Endocrine: Negative. Genitourinary: Negative. Musculoskeletal:        See  hpi   Allergic/Immunologic: Negative. Neurological: Negative. Hematological: Negative. Psychiatric/Behavioral: Negative. Current Outpatient Medications:     methylPREDNISolone (MEDROL DOSEPACK) 4 MG tablet, Take by mouth., Disp: 1 kit, Rfl: 1    triamcinolone (KENALOG) 0.1 % cream, Apply topically 2 times daily Apply topically 2 times daily. , Disp: , Rfl:     neomycin-polymyxin-dexamethasone (MAXITROL) 0.1 % ophthalmic suspension, Place 2 drops into both eyes 4 times daily, Disp: 1 Bottle, Rfl: 12    permethrin (ELIMITE) 5 % cream, Apply topically daily, Disp: 9 Tube, Rfl: 12  No Known Allergies  Social History     Socioeconomic History    Marital status:      Spouse name: Not on file    Number of children: Not on file    Years of education: Not on file    Highest education level: Not on file   Occupational History    Not on file   Tobacco Use    Smoking status: Former Smoker     Types: Cigarettes    Smokeless tobacco: Never Used   Substance and Sexual Activity    Alcohol use:  Yes    Drug use: Never    Sexual activity: Not on file   Other Topics Concern    Not on file   Social History Narrative            Colonoscopy - (3/21/2011)    Colonoscopy Screening - (3/21/2011)    SMOKER--QUIT    ETOH    --LONG DISTANCE    HIGH CHOL--REFUSES TREATMENT---    ONE KID WITH FIRST WIFE---    RASH 4-13-----OCD ABOUT [facility] AND [facility]-- DELUSIONS OF PARASITOSIS    PT REFUSES COLONOSCOPY 2-19    DISABILITY FOR PSYCH 2018    HYPERLIPIDEMIA 2-19 REFUSES STATIN OR ANY MED    Elev  LFT   3-21 and wife told me after drinking bottle of etoh every three days--         Social Determinants of Health     Financial Resource Strain:     Difficulty of Paying Living Expenses:    Food Insecurity:     Worried About Running Out of Food in the Last Year:     [address] in the Last Year:    Transportation Needs:     Lack of Transportation (Medical):  Lack of Transportation (Non-Medical):    Physical Activity:     Days of Exercise per Week:     Minutes of Exercise per Session:    Stress:     Feeling of Stress :    Social Connections:     Frequency of Communication with Friends and Family:     Frequency of Social Gatherings with Friends and Family:     Attends Church Services:     Active Member of Clubs or Organizations:     Attends Club or Organization Meetings:     Marital Status:    Intimate Partner Violence:     Fear of Current or Ex-Partner:     Emotionally Abused:     Physically Abused:     Sexually Abused:       Past Medical History:   Diagnosis Date    Hyperlipidemia     Hypertension     Leg swelling     Rash     Type 2 diabetes mellitus without complication ([facility] [address]      History reviewed. No pertinent family history. Past Surgical History:   Procedure Laterality Date    TONSILLECTOMY        Vitals:    08/09/21 1447   BP: 134/82   Pulse: 108   Resp: 16   Temp: 98.2 °F (36.8 °C)   SpO2: 97%   Weight: 248 lb (112.5 kg)       Objective:    Physical Exam  Vitals reviewed. Constitutional:       Appearance: He is well-developed. HENT:      Head: Normocephalic. Eyes:      Pupils: Pupils are equal, round, and reactive to light. Cardiovascular:      Rate and Rhythm: Normal rate and regular rhythm.    Pulmonary:      Effort: Pulmonary effort is normal.      Breath sounds: Normal breath sounds. Abdominal:      Palpations: Abdomen is soft. Musculoskeletal:      Cervical back: Normal range of motion. Comments: Only mild tender with palp left  First mtp  Joint with no addison or warmth    And babld to move   First toe without pain   Skin:     General: Skin is warm. Neurological:      Mental Status: He is alert and oriented to person, place, and time. Psychiatric:         Behavior: Behavior normal.         Magy Dougherty was seen today for foot injury. Diagnoses and all orders for this visit:    Other secondary acute gout of right foot  -     XR FOOT RIGHT (MIN 3 VIEWS); Future  -     XR TOE RIGHT (MIN 2 VIEWS); Future    Contusion of right foot, initial encounter  -     XR FOOT RIGHT (MIN 3 VIEWS); Future  -     XR TOE RIGHT (MIN 2 VIEWS); Future    Other orders  -     methylPREDNISolone (MEDROL DOSEPACK) 4 MG tablet; Take by mouth.  -     Uric Acid; Future  -     Sedimentation Rate; Future  -     Cancel: XR FOOT LEFT (MIN 3 VIEWS); Future  -     Cancel: XR TOE LEFT (MIN 2 VIEWS); Future        Comments: steroid    X ray    Lab with  Uric acid   Esr  Not  Great then call for  postirayt appt       A great deal of time spent reviewing medications, diet, exercise, social issues. Also reviewing the chart before entering the room with patient and finishing charting after leaving patient's room. More than half of that time was spent face to face with the patient in counseling and coordinating care. Follow Up: Return for Reg Appt.      Seen by:  Feliciano Cranker, DO

## 2021-08-10 ENCOUNTER — TELEPHONE (OUTPATIENT)
Dept: PRIMARY CARE CLINIC | Age: 61
End: 2021-08-10

## 2021-08-10 DIAGNOSIS — M10.471 OTHER SECONDARY ACUTE GOUT OF RIGHT FOOT: ICD-10-CM

## 2021-08-10 DIAGNOSIS — S90.31XA CONTUSION OF RIGHT FOOT, INITIAL ENCOUNTER: ICD-10-CM

## 2022-02-14 ENCOUNTER — OFFICE VISIT (OUTPATIENT)
Dept: PRIMARY CARE CLINIC | Age: 62
End: 2022-02-14
Payer: MEDICARE

## 2022-02-14 DIAGNOSIS — M15.9 PRIMARY OSTEOARTHRITIS INVOLVING MULTIPLE JOINTS: ICD-10-CM

## 2022-02-14 DIAGNOSIS — M10.471 OTHER SECONDARY ACUTE GOUT OF RIGHT FOOT: ICD-10-CM

## 2022-02-14 DIAGNOSIS — E11.9 DIET-CONTROLLED DIABETES MELLITUS (HCC): ICD-10-CM

## 2022-02-14 DIAGNOSIS — F41.9 ANXIETY: Primary | Chronic | ICD-10-CM

## 2022-02-14 DIAGNOSIS — L30.9 DERMATITIS: ICD-10-CM

## 2022-02-14 DIAGNOSIS — I10 ESSENTIAL HYPERTENSION: ICD-10-CM

## 2022-02-14 DIAGNOSIS — E55.9 VITAMIN D DEFICIENCY: ICD-10-CM

## 2022-02-14 DIAGNOSIS — E03.9 ACQUIRED HYPOTHYROIDISM: ICD-10-CM

## 2022-02-14 DIAGNOSIS — Z12.5 PROSTATE CANCER SCREENING: ICD-10-CM

## 2022-02-14 PROCEDURE — 3017F COLORECTAL CA SCREEN DOC REV: CPT | Performed by: FAMILY MEDICINE

## 2022-02-14 PROCEDURE — 1036F TOBACCO NON-USER: CPT | Performed by: FAMILY MEDICINE

## 2022-02-14 PROCEDURE — 99214 OFFICE O/P EST MOD 30 MIN: CPT | Performed by: FAMILY MEDICINE

## 2022-02-14 PROCEDURE — G8417 CALC BMI ABV UP PARAM F/U: HCPCS | Performed by: FAMILY MEDICINE

## 2022-02-14 PROCEDURE — 3046F HEMOGLOBIN A1C LEVEL >9.0%: CPT | Performed by: FAMILY MEDICINE

## 2022-02-14 PROCEDURE — 2022F DILAT RTA XM EVC RTNOPTHY: CPT | Performed by: FAMILY MEDICINE

## 2022-02-14 PROCEDURE — G8428 CUR MEDS NOT DOCUMENT: HCPCS | Performed by: FAMILY MEDICINE

## 2022-02-14 PROCEDURE — G8484 FLU IMMUNIZE NO ADMIN: HCPCS | Performed by: FAMILY MEDICINE

## 2022-02-14 RX ORDER — METHYLPREDNISOLONE 4 MG/1
TABLET ORAL
Qty: 1 KIT | Refills: 1 | Status: SHIPPED
Start: 2022-02-14 | End: 2022-09-14

## 2022-02-14 RX ORDER — PERMETHRIN 50 MG/G
CREAM TOPICAL
Qty: 9 EACH | Refills: 12 | Status: SHIPPED | OUTPATIENT
Start: 2022-02-14

## 2022-02-14 NOTE — PROGRESS NOTES
22  Name: Shantell Wharton    : 1960    Sex: male    Age: 62 y.o. Subjective:  Chief Complaint: Patient is here for gout and parasite ptosis. anx   OA  Patient still living his life frequently dealing with parasite issue. He sees a dermatologist Dr. Bernabe herr every 3 months. Uses bleach frequently on his skin. Use promethazine frequently. He has had burning around his eyes but is a soft close to his eyes because of the feels the organisms are moving around his eyelids. He has not blood work done at that time for repeat ultrasound. He has some pain in his feet at times and uses rare Medrol Dosepak for gout flareups. Very anxious. Review of Systems   Constitutional: Negative. HENT: Negative. Eyes: Negative. Respiratory: Negative. Cardiovascular: Negative. Gastrointestinal: Negative. Endocrine: Negative. Genitourinary: Negative. Musculoskeletal: Negative. Skin: Positive for rash. Allergic/Immunologic: Negative. Neurological: Negative. Hematological: Negative. Psychiatric/Behavioral: The patient is nervous/anxious. Current Outpatient Medications:     permethrin (ELIMITE) 5 % cream, Apply topically daily, Disp: 9 each, Rfl: 12    methylPREDNISolone (MEDROL DOSEPACK) 4 MG tablet, Take by mouth., Disp: 1 kit, Rfl: 1    triamcinolone (KENALOG) 0.1 % cream, Apply topically 2 times daily Apply topically 2 times daily. , Disp: , Rfl:     neomycin-polymyxin-dexamethasone (MAXITROL) 0.1 % ophthalmic suspension, Place 2 drops into both eyes 4 times daily, Disp: 1 Bottle, Rfl: 12  No Known Allergies  Social History     Socioeconomic History    Marital status:      Spouse name: Not on file    Number of children: Not on file    Years of education: Not on file    Highest education level: Not on file   Occupational History    Not on file   Tobacco Use    Smoking status: Former Smoker     Types: Cigarettes    Smokeless tobacco: Never Used Substance and Sexual Activity    Alcohol use: Yes    Drug use: Never    Sexual activity: Not on file   Other Topics Concern    Not on file   Social History Narrative            Colonoscopy - (3/21/2011)    Colonoscopy Screening - (3/21/2011)    SMOKER--QUIT    ETOH    --LONG DISTANCE    HIGH CHOL--REFUSES TREATMENT---    ONE KID WITH FIRST WIFE---    RASH 4-13-----OCD ABOUT [facility] AND [facility]-- DELUSIONS OF PARASITOSIS    PT REFUSES COLONOSCOPY 2-19    DISABILITY FOR PSYCH 2018    HYPERLIPIDEMIA 2-19 REFUSES STATIN OR ANY MED    Elev  LFT   3-21 and wife told me after drinking bottle of etoh every three days--         Social Determinants of Health     Financial Resource Strain:     Difficulty of Paying Living Expenses: Not on file   Food Insecurity:     Worried About Running Out of Food in the Last Year: Not on file    [place] of Food in the Last Year: Not on file   Transportation Needs:     Lack of Transportation (Medical): Not on file    Lack of Transportation (Non-Medical):  Not on file   Physical Activity:     Days of Exercise per Week: Not on file    Minutes of Exercise per Session: Not on file   Stress:     Feeling of Stress : Not on file   Social Connections:     Frequency of Communication with Friends and Family: Not on file    Frequency of Social Gatherings with Friends and Family: Not on file    Attends Synagogue Services: Not on file    Active Member of [address] [place] [address] or Organizations: Not on file    Attends Club or Organization Meetings: Not on file    Marital Status: Not on file   Intimate Partner Violence:     Fear of Current or Ex-Partner: Not on file    Emotionally Abused: Not on file    Physically Abused: Not on file    Sexually Abused: Not on file   Housing Stability:     Unable to Pay for Housing in the Last Year: Not on file    Number of [place] in the Last Year: Not on file    Unstable Housing in the Last Year: Not on file      Past Medical History: Diagnosis Date    Hyperlipidemia     Hypertension     Leg swelling     Rash     Type 2 diabetes mellitus without complication (HCC)      No family history on file. Past Surgical History:   Procedure Laterality Date    TONSILLECTOMY        Vitals:    02/14/22 1640   BP: 132/82   Temp: 97.9 °F (36.6 °C)   TempSrc: Infrared   Weight: 260 lb (117.9 kg)       Objective:    Physical Exam  Vitals reviewed. Constitutional:       Appearance: He is well-developed. HENT:      Head: Normocephalic. Eyes:      Pupils: Pupils are equal, round, and reactive to light. Cardiovascular:      Rate and Rhythm: Normal rate and regular rhythm. Pulmonary:      Effort: Pulmonary effort is normal.      Breath sounds: Normal breath sounds. Abdominal:      Palpations: Abdomen is soft. Musculoskeletal:         General: Normal range of motion. Cervical back: Normal range of motion. Skin:     General: Skin is warm. Comments: Skin is very dry. Neurological:      Mental Status: He is alert and oriented to person, place, and time. Psychiatric:         Behavior: Behavior normal.         Santo Barrera was seen today for medication refill. Diagnoses and all orders for this visit:    Anxiety    Dermatitis  -     permethrin (ELIMITE) 5 % cream; Apply topically daily    Other secondary acute gout of right foot  -     methylPREDNISolone (MEDROL DOSEPACK) 4 MG tablet; Take by mouth. Primary osteoarthritis involving multiple joints        Comments: Laboratory studies soon. Health maintenance issues. Follow-up dermatology. [facility] he will consider. Symptoms well notify. Range of motion exercises taught. Caution use of the Medrol Dosepak. Caution with promethazine. Use for dermatology    I educated the patient about all medications. Make sure they were correct and educated  on the risk associated with missing meds or taking them incorrectly.   A list of medications is being sent home with patient today.      I informed patient about the risk associated with noncompliance of medication and taking medications incorrectly. Appropriate follow-up with myself and all specialist.  Encourage family members to take active role in assisting with medications and medical care. If any confusion should develop to notify my office immediately to avoid risk of worsening medical condition      A great deal of time spent reviewing medications, diet, exercise, social issues. Also reviewing the chart before entering the room with patient and finishing charting after leaving patient's room. More than half of that time was spent face to face with the patient in counseling and coordinating care. Follow Up: No follow-ups on file.      Seen by:  Austin Lee DO

## 2022-02-15 VITALS
WEIGHT: 260 LBS | SYSTOLIC BLOOD PRESSURE: 132 MMHG | BODY MASS INDEX: 37.31 KG/M2 | TEMPERATURE: 97.9 F | DIASTOLIC BLOOD PRESSURE: 82 MMHG

## 2022-02-15 PROBLEM — M15.0 PRIMARY OSTEOARTHRITIS INVOLVING MULTIPLE JOINTS: Status: ACTIVE | Noted: 2022-02-15

## 2022-02-15 PROBLEM — M15.9 PRIMARY OSTEOARTHRITIS INVOLVING MULTIPLE JOINTS: Status: ACTIVE | Noted: 2022-02-15

## 2022-02-15 ASSESSMENT — PATIENT HEALTH QUESTIONNAIRE - PHQ9
SUM OF ALL RESPONSES TO PHQ9 QUESTIONS 1 & 2: 0
SUM OF ALL RESPONSES TO PHQ QUESTIONS 1-9: 0
2. FEELING DOWN, DEPRESSED OR HOPELESS: 0
SUM OF ALL RESPONSES TO PHQ QUESTIONS 1-9: 0
SUM OF ALL RESPONSES TO PHQ QUESTIONS 1-9: 0
1. LITTLE INTEREST OR PLEASURE IN DOING THINGS: 0
SUM OF ALL RESPONSES TO PHQ QUESTIONS 1-9: 0

## 2022-02-15 ASSESSMENT — ENCOUNTER SYMPTOMS
ALLERGIC/IMMUNOLOGIC NEGATIVE: 1
EYES NEGATIVE: 1
GASTROINTESTINAL NEGATIVE: 1
RESPIRATORY NEGATIVE: 1

## 2022-02-18 ENCOUNTER — HOSPITAL ENCOUNTER (OUTPATIENT)
Age: 62
Discharge: HOME | End: 2022-02-18
Payer: MEDICARE

## 2022-02-18 DIAGNOSIS — Z12.5 PROSTATE CANCER SCREENING: ICD-10-CM

## 2022-02-18 DIAGNOSIS — E55.9 VITAMIN D DEFICIENCY: ICD-10-CM

## 2022-02-18 DIAGNOSIS — I10 ESSENTIAL HYPERTENSION: ICD-10-CM

## 2022-02-18 LAB
ALBUMIN SERPL-MCNC: 4.8 G/DL (ref 3.5–5.2)
ALP BLD-CCNC: 95 U/L (ref 40–129)
ALT SERPL-CCNC: 30 U/L (ref 0–40)
ANION GAP SERPL CALCULATED.3IONS-SCNC: 11 MMOL/L (ref 7–16)
AST SERPL-CCNC: 19 U/L (ref 0–39)
BASOPHILS ABSOLUTE: 0.04 E9/L (ref 0–0.2)
BASOPHILS RELATIVE PERCENT: 0.6 % (ref 0–2)
BILIRUB SERPL-MCNC: 0.5 MG/DL (ref 0–1.2)
BILIRUBIN URINE: NEGATIVE
BLOOD, URINE: NEGATIVE
BUN BLDV-MCNC: 12 MG/DL (ref 6–23)
CALCIUM SERPL-MCNC: 9.6 MG/DL (ref 8.6–10.2)
CHLORIDE BLD-SCNC: 104 MMOL/L (ref 98–107)
CHOLESTEROL, TOTAL: 248 MG/DL (ref 0–199)
CLARITY: CLEAR
CO2: 26 MMOL/L (ref 22–29)
COLOR: YELLOW
CREAT SERPL-MCNC: 0.8 MG/DL (ref 0.7–1.2)
EOSINOPHILS ABSOLUTE: 0.3 E9/L (ref 0.05–0.5)
EOSINOPHILS RELATIVE PERCENT: 4.6 % (ref 0–6)
GFR AFRICAN AMERICAN: >60
GFR NON-AFRICAN AMERICAN: >60 ML/MIN/1.73
GLUCOSE BLD-MCNC: 109 MG/DL (ref 74–99)
GLUCOSE URINE: NEGATIVE MG/DL
HCT VFR BLD CALC: 42.8 % (ref 37–54)
HDLC SERPL-MCNC: 44 MG/DL
HEMOGLOBIN: 14.4 G/DL (ref 12.5–16.5)
IMMATURE GRANULOCYTES #: 0.01 E9/L
IMMATURE GRANULOCYTES %: 0.2 % (ref 0–5)
KETONES, URINE: NEGATIVE MG/DL
LDL CHOLESTEROL CALCULATED: 182 MG/DL (ref 0–99)
LEUKOCYTE ESTERASE, URINE: NEGATIVE
LYMPHOCYTES ABSOLUTE: 2.06 E9/L (ref 1.5–4)
LYMPHOCYTES RELATIVE PERCENT: 31.7 % (ref 20–42)
MCH RBC QN AUTO: 31.7 PG (ref 26–35)
MCHC RBC AUTO-ENTMCNC: 33.6 % (ref 32–34.5)
MCV RBC AUTO: 94.3 FL (ref 80–99.9)
MONOCYTES ABSOLUTE: 0.54 E9/L (ref 0.1–0.95)
MONOCYTES RELATIVE PERCENT: 8.3 % (ref 2–12)
NEUTROPHILS ABSOLUTE: 3.54 E9/L (ref 1.8–7.3)
NEUTROPHILS RELATIVE PERCENT: 54.6 % (ref 43–80)
NITRITE, URINE: NEGATIVE
PDW BLD-RTO: 13 FL (ref 11.5–15)
PH UA: 5.5 (ref 5–9)
PLATELET # BLD: 157 E9/L (ref 130–450)
PMV BLD AUTO: 11.8 FL (ref 7–12)
POTASSIUM SERPL-SCNC: 4.3 MMOL/L (ref 3.5–5)
PROSTATE SPECIFIC ANTIGEN: 0.3 NG/ML (ref 0–4)
PROTEIN UA: NEGATIVE MG/DL
RBC # BLD: 4.54 E12/L (ref 3.8–5.8)
SODIUM BLD-SCNC: 141 MMOL/L (ref 132–146)
SPECIFIC GRAVITY UA: 1.02 (ref 1–1.03)
TOTAL PROTEIN: 6.8 G/DL (ref 6.4–8.3)
TRIGL SERPL-MCNC: 109 MG/DL (ref 0–149)
UROBILINOGEN, URINE: 0.2 E.U./DL
VITAMIN D 25-HYDROXY: 15 NG/ML (ref 30–100)
VLDLC SERPL CALC-MCNC: 22 MG/DL
WBC # BLD: 6.5 E9/L (ref 4.5–11.5)

## 2022-02-18 PROCEDURE — 80053 COMPREHEN METABOLIC PANEL: CPT

## 2022-02-18 PROCEDURE — 80061 LIPID PANEL: CPT

## 2022-02-18 PROCEDURE — 36415 COLL VENOUS BLD VENIPUNCTURE: CPT

## 2022-02-18 PROCEDURE — G0103 PSA SCREENING: HCPCS

## 2022-02-18 PROCEDURE — 81003 URINALYSIS AUTO W/O SCOPE: CPT

## 2022-02-18 PROCEDURE — 85025 COMPLETE CBC W/AUTO DIFF WBC: CPT

## 2022-02-18 PROCEDURE — 82306 VITAMIN D 25 HYDROXY: CPT

## 2022-02-21 ENCOUNTER — TELEPHONE (OUTPATIENT)
Dept: PRIMARY CARE CLINIC | Age: 62
End: 2022-02-21

## 2022-02-21 NOTE — TELEPHONE ENCOUNTER
Notify the patient is lab work shows cholesterol is high at 248. Aggressive low-fat diet. Also it is vitamin D is low. Start vitamin D3 2000 daily.   All else okay

## 2022-09-14 ENCOUNTER — OFFICE VISIT (OUTPATIENT)
Dept: PRIMARY CARE CLINIC | Age: 62
End: 2022-09-14
Payer: MEDICARE

## 2022-09-14 DIAGNOSIS — F41.9 ANXIETY: Chronic | ICD-10-CM

## 2022-09-14 DIAGNOSIS — K29.00 OTHER ACUTE GASTRITIS WITHOUT HEMORRHAGE: Primary | ICD-10-CM

## 2022-09-14 DIAGNOSIS — R10.13 ABDOMINAL PAIN, EPIGASTRIC: ICD-10-CM

## 2022-09-14 DIAGNOSIS — F22 DELUSIONS OF PARASITOSIS (HCC): ICD-10-CM

## 2022-09-14 PROCEDURE — G8417 CALC BMI ABV UP PARAM F/U: HCPCS | Performed by: FAMILY MEDICINE

## 2022-09-14 PROCEDURE — 1036F TOBACCO NON-USER: CPT | Performed by: FAMILY MEDICINE

## 2022-09-14 PROCEDURE — 3017F COLORECTAL CA SCREEN DOC REV: CPT | Performed by: FAMILY MEDICINE

## 2022-09-14 PROCEDURE — 99214 OFFICE O/P EST MOD 30 MIN: CPT | Performed by: FAMILY MEDICINE

## 2022-09-14 PROCEDURE — G8428 CUR MEDS NOT DOCUMENT: HCPCS | Performed by: FAMILY MEDICINE

## 2022-09-14 RX ORDER — OMEPRAZOLE 40 MG/1
40 CAPSULE, DELAYED RELEASE ORAL
Qty: 30 CAPSULE | Refills: 0 | Status: SHIPPED | OUTPATIENT
Start: 2022-09-14

## 2022-09-14 RX ORDER — SUCRALFATE 1 G/1
1 TABLET ORAL
Qty: 120 TABLET | Refills: 0 | Status: SHIPPED | OUTPATIENT
Start: 2022-09-14

## 2022-09-14 NOTE — PROGRESS NOTES
22  Name: Graciela Betts    : 1960    Sex: male    Age: 65 y.o. Subjective:  Chief Complaint: Patient is here for epigastric pain. Anxiety. Complains of epigastric pain off and on for a year. Worse the last month or so. Has tried nothing to help with it. No chest pain no shortness of breath no palpitations. Has refused endoscopy or colonoscopy in the past.  He feels the pain daily. Still has extreme delusions of parasitosis. Bowel movements are currently loose. Takes ibuprofen I advised him discontinue      Review of Systems   Constitutional: Negative. HENT: Negative. Eyes: Negative. Respiratory: Negative. Cardiovascular: Negative. Gastrointestinal:  Positive for abdominal pain (epi). Endocrine: Negative. Genitourinary: Negative. Musculoskeletal: Negative. Skin: Negative. Allergic/Immunologic: Negative. Neurological: Negative. Hematological: Negative. Psychiatric/Behavioral: Negative. Current Outpatient Medications:     omeprazole (PRILOSEC) 40 MG delayed release capsule, Take 1 capsule by mouth every morning (before breakfast), Disp: 30 capsule, Rfl: 0    sucralfate (CARAFATE) 1 GM tablet, Take 1 tablet by mouth 4 times daily (before meals and nightly), Disp: 120 tablet, Rfl: 0    permethrin (ELIMITE) 5 % cream, Apply topically daily, Disp: 9 each, Rfl: 12    triamcinolone (KENALOG) 0.1 % cream, Apply topically 2 times daily Apply topically 2 times daily. , Disp: , Rfl:     neomycin-polymyxin-dexamethasone (MAXITROL) 0.1 % ophthalmic suspension, Place 2 drops into both eyes 4 times daily, Disp: 1 Bottle, Rfl: 12  No Known Allergies  Social History     Socioeconomic History    Marital status:      Spouse name: Not on file    Number of children: Not on file    Years of education: Not on file    Highest education level: Not on file   Occupational History    Not on file   Tobacco Use    Smoking status: Former     Types: Cigarettes Smokeless tobacco: Never   Substance and Sexual Activity    Alcohol use: Yes    Drug use: Never    Sexual activity: Not on file   Other Topics Concern    Not on file   Social History Narrative            Colonoscopy - (3/21/2011)    Colonoscopy Screening - (3/21/2011)    SMOKER--QUIT    ETOH    --LONG DISTANCE    HIGH CHOL--REFUSES TREATMENT---    ONE KID WITH FIRST WIFE---    RASH 4-13-----OCD ABOUT [facility] AND [facility]-- DELUSIONS OF PARASITOSIS    PT REFUSES COLONOSCOPY 2-19    DISABILITY FOR PSYCH 2018    HYPERLIPIDEMIA 2-19 REFUSES STATIN OR ANY MED    Elev  LFT   3-21 and wife told me after drinking bottle of etoh every three days--         Social Determinants of Health     Financial Resource Strain: Not on file   Food Insecurity: Not on file   Transportation Needs: Not on file   Physical Activity: Not on file   Stress: Not on file   Social Connections: Not on file   Intimate Partner Violence: Not on file   Housing Stability: Not on file      Past Medical History:   Diagnosis Date    Hyperlipidemia     Hypertension     Leg swelling     Rash     Type 2 diabetes mellitus without complication ([facility] [address]      No family history on file. Past Surgical History:   Procedure Laterality Date    TONSILLECTOMY        Vitals:    09/14/22 1702   BP: 135/82   Temp: 97.9 °F (36.6 °C)   TempSrc: Oral   Weight: 269 lb (122 kg)   Height: 5' 10\" (1.778 m)       Objective:    Physical Exam  Vitals reviewed. Constitutional:       Appearance: Normal appearance. He is well-developed. HENT:      Head: Normocephalic. Right Ear: Tympanic membrane normal.      Left Ear: Tympanic membrane normal.      Nose: Nose normal.      Mouth/Throat:      Mouth: Mucous membranes are moist.   Eyes:      Pupils: Pupils are equal, round, and reactive to light. Cardiovascular:      Rate and Rhythm: Normal rate and regular rhythm. Pulmonary:      Effort: Pulmonary effort is normal.      Breath sounds: Normal breath sounds. Abdominal:      General: Bowel sounds are normal.      Palpations: Abdomen is soft. Musculoskeletal:         General: Normal range of motion. Cervical back: Normal range of motion. Skin:     General: Skin is warm. Neurological:      Mental Status: He is alert and oriented to person, place, and time. Psychiatric:         Behavior: Behavior normal.       Saadia Alarcon was seen today for constipation, diarrhea and abdominal pain. Diagnoses and all orders for this visit:    Other acute gastritis without hemorrhage  -     omeprazole (PRILOSEC) 40 MG delayed release capsule; Take 1 capsule by mouth every morning (before breakfast)  -     sucralfate (CARAFATE) 1 GM tablet; Take 1 tablet by mouth 4 times daily (before meals and nightly)  -     Caitlin Shultz MD, General Surgery, [facility]    Delusions of parasitosis [facility])    Abdominal pain, epigastric  -     Amayaremi Flor MD, General Surgery, [facility]    Anxiety      Comments: Medication. If symptoms worsen go to ER. If any black stools or vomiting go to ER. Appointment Dr. Felicia Triana. Discontinue ibuprofen and nail anti-inflammatories. I educated the patient about all medications. Make sure they were correct and educated  on the risk associated with missing meds or taking them incorrectly. A list of medications is being sent home with patient today. Check blood pressure at home twice a day. Low-salt low caffeine diet. Call if systolic blood pressure is above 259 and diastolic blood pressures above 85. Only use a upper arm digital cuff. I informed patient about the risk associated with noncompliance of medication and taking medications incorrectly. Appropriate follow-up with myself and all specialist.  Encourage family members to take active role in assisting with medications and medical care.   If any confusion should develop to notify my office immediately to avoid risk of worsening medical condition      A great deal of time spent reviewing medications, diet, exercise, social issues. Also reviewing the chart before entering the room with patient and finishing charting after leaving patient's room. More than half of that time was spent face to face with the patient in counseling and coordinating care.       Follow Up: Return for Reg Appt, See Referral.     Seen by:  Mc Cazares DO

## 2022-09-15 ENCOUNTER — TELEPHONE (OUTPATIENT)
Dept: PRIMARY CARE CLINIC | Age: 62
End: 2022-09-15

## 2022-09-15 VITALS
BODY MASS INDEX: 38.51 KG/M2 | TEMPERATURE: 97.9 F | HEIGHT: 70 IN | DIASTOLIC BLOOD PRESSURE: 82 MMHG | WEIGHT: 269 LBS | SYSTOLIC BLOOD PRESSURE: 135 MMHG

## 2022-09-15 PROBLEM — R10.13 ABDOMINAL PAIN, EPIGASTRIC: Status: ACTIVE | Noted: 2022-09-15

## 2022-09-15 PROBLEM — F22 DELUSIONS OF PARASITOSIS (HCC): Status: ACTIVE | Noted: 2022-09-15

## 2022-09-15 ASSESSMENT — PATIENT HEALTH QUESTIONNAIRE - PHQ9
2. FEELING DOWN, DEPRESSED OR HOPELESS: 0
SUM OF ALL RESPONSES TO PHQ QUESTIONS 1-9: 0
SUM OF ALL RESPONSES TO PHQ9 QUESTIONS 1 & 2: 0
SUM OF ALL RESPONSES TO PHQ QUESTIONS 1-9: 0
SUM OF ALL RESPONSES TO PHQ QUESTIONS 1-9: 0
1. LITTLE INTEREST OR PLEASURE IN DOING THINGS: 0
SUM OF ALL RESPONSES TO PHQ QUESTIONS 1-9: 0

## 2022-09-15 ASSESSMENT — ENCOUNTER SYMPTOMS
ALLERGIC/IMMUNOLOGIC NEGATIVE: 1
ABDOMINAL PAIN: 1
RESPIRATORY NEGATIVE: 1
EYES NEGATIVE: 1

## 2022-09-15 NOTE — TELEPHONE ENCOUNTER
Pt wants you to know he's not going to go for the scope you wanted him to, says his \"parasite infection\" makes it too much for him to do it.

## 2023-02-07 DIAGNOSIS — L30.9 DERMATITIS: ICD-10-CM

## 2023-02-07 RX ORDER — PERMETHRIN 50 MG/G
CREAM TOPICAL
Qty: 9 EACH | Refills: 12 | Status: SHIPPED | OUTPATIENT
Start: 2023-02-07

## 2023-02-18 ENCOUNTER — TELEPHONE (OUTPATIENT)
Dept: PRIMARY CARE CLINIC | Age: 63
End: 2023-02-18

## 2023-06-01 DIAGNOSIS — L30.9 DERMATITIS: ICD-10-CM

## 2023-06-01 RX ORDER — PERMETHRIN 50 MG/G
CREAM TOPICAL
Qty: 9 EACH | Refills: 12 | Status: SHIPPED | OUTPATIENT
Start: 2023-06-01

## 2025-08-07 ENCOUNTER — OFFICE VISIT (OUTPATIENT)
Dept: FAMILY MEDICINE CLINIC | Age: 65
End: 2025-08-07

## 2025-08-07 VITALS
BODY MASS INDEX: 37.65 KG/M2 | DIASTOLIC BLOOD PRESSURE: 78 MMHG | SYSTOLIC BLOOD PRESSURE: 138 MMHG | RESPIRATION RATE: 18 BRPM | TEMPERATURE: 99.6 F | WEIGHT: 263 LBS | HEART RATE: 95 BPM | HEIGHT: 70 IN | OXYGEN SATURATION: 99 %

## 2025-08-07 DIAGNOSIS — M25.551 RIGHT HIP PAIN: ICD-10-CM

## 2025-08-07 DIAGNOSIS — M54.41 LOW BACK PAIN WITH RIGHT-SIDED SCIATICA, UNSPECIFIED BACK PAIN LATERALITY, UNSPECIFIED CHRONICITY: Primary | ICD-10-CM

## 2025-08-07 RX ORDER — METHYLPREDNISOLONE 4 MG/1
TABLET ORAL
Qty: 1 KIT | Refills: 0 | Status: SHIPPED | OUTPATIENT
Start: 2025-08-07

## 2025-08-07 RX ORDER — TIZANIDINE 2 MG/1
2 TABLET ORAL EVERY 8 HOURS PRN
Qty: 21 TABLET | Refills: 0 | Status: SHIPPED | OUTPATIENT
Start: 2025-08-07

## 2025-08-07 RX ORDER — KETOROLAC TROMETHAMINE 30 MG/ML
30 INJECTION, SOLUTION INTRAMUSCULAR; INTRAVENOUS ONCE
Status: COMPLETED | OUTPATIENT
Start: 2025-08-07 | End: 2025-08-07

## 2025-08-07 RX ORDER — AMMONIUM LACTATE 12 G/100G
LOTION TOPICAL
COMMUNITY
Start: 2025-05-08

## 2025-08-07 RX ADMIN — KETOROLAC TROMETHAMINE 30 MG: 30 INJECTION, SOLUTION INTRAMUSCULAR; INTRAVENOUS at 15:12
